# Patient Record
Sex: FEMALE | Race: OTHER | NOT HISPANIC OR LATINO | URBAN - METROPOLITAN AREA
[De-identification: names, ages, dates, MRNs, and addresses within clinical notes are randomized per-mention and may not be internally consistent; named-entity substitution may affect disease eponyms.]

---

## 2020-07-06 ENCOUNTER — INPATIENT (INPATIENT)
Facility: HOSPITAL | Age: 30
LOS: 2 days | Discharge: HOME | End: 2020-07-09
Attending: OBSTETRICS & GYNECOLOGY | Admitting: OBSTETRICS & GYNECOLOGY
Payer: COMMERCIAL

## 2020-07-06 VITALS
SYSTOLIC BLOOD PRESSURE: 109 MMHG | TEMPERATURE: 98 F | DIASTOLIC BLOOD PRESSURE: 67 MMHG | HEART RATE: 108 BPM | RESPIRATION RATE: 16 BRPM

## 2020-07-06 DIAGNOSIS — Z98.890 OTHER SPECIFIED POSTPROCEDURAL STATES: Chronic | ICD-10-CM

## 2020-07-06 DIAGNOSIS — O26.899 OTHER SPECIFIED PREGNANCY RELATED CONDITIONS, UNSPECIFIED TRIMESTER: ICD-10-CM

## 2020-07-06 DIAGNOSIS — Z3A.00 WEEKS OF GESTATION OF PREGNANCY NOT SPECIFIED: ICD-10-CM

## 2020-07-06 DIAGNOSIS — O42.92 FULL-TERM PREMATURE RUPTURE OF MEMBRANES, UNSPECIFIED AS TO LENGTH OF TIME BETWEEN RUPTURE AND ONSET OF LABOR: ICD-10-CM

## 2020-07-06 LAB
BASOPHILS # BLD AUTO: 0.03 K/UL — SIGNIFICANT CHANGE UP (ref 0–0.2)
BASOPHILS NFR BLD AUTO: 0.3 % — SIGNIFICANT CHANGE UP (ref 0–2)
BLD GP AB SCN SERPL QL: NEGATIVE — SIGNIFICANT CHANGE UP
EOSINOPHIL # BLD AUTO: 0.14 K/UL — SIGNIFICANT CHANGE UP (ref 0–0.5)
EOSINOPHIL NFR BLD AUTO: 1.4 % — SIGNIFICANT CHANGE UP (ref 0–6)
HCT VFR BLD CALC: 37.2 % — SIGNIFICANT CHANGE UP (ref 34.5–45)
HGB BLD-MCNC: 12.2 G/DL — SIGNIFICANT CHANGE UP (ref 11.5–15.5)
IMM GRANULOCYTES NFR BLD AUTO: 1 % — SIGNIFICANT CHANGE UP (ref 0–1.5)
LYMPHOCYTES # BLD AUTO: 1.94 K/UL — SIGNIFICANT CHANGE UP (ref 1–3.3)
LYMPHOCYTES # BLD AUTO: 18.7 % — SIGNIFICANT CHANGE UP (ref 13–44)
MCHC RBC-ENTMCNC: 26.9 PG — LOW (ref 27–34)
MCHC RBC-ENTMCNC: 32.8 % — SIGNIFICANT CHANGE UP (ref 32–36)
MCV RBC AUTO: 81.9 FL — SIGNIFICANT CHANGE UP (ref 80–100)
MONOCYTES # BLD AUTO: 0.66 K/UL — SIGNIFICANT CHANGE UP (ref 0–0.9)
MONOCYTES NFR BLD AUTO: 6.4 % — SIGNIFICANT CHANGE UP (ref 2–14)
NEUTROPHILS # BLD AUTO: 7.49 K/UL — HIGH (ref 1.8–7.4)
NEUTROPHILS NFR BLD AUTO: 72.2 % — SIGNIFICANT CHANGE UP (ref 43–77)
NRBC # FLD: 0 K/UL — SIGNIFICANT CHANGE UP (ref 0–0)
PLATELET # BLD AUTO: 226 K/UL — SIGNIFICANT CHANGE UP (ref 150–400)
PMV BLD: 9.5 FL — SIGNIFICANT CHANGE UP (ref 7–13)
RBC # BLD: 4.54 M/UL — SIGNIFICANT CHANGE UP (ref 3.8–5.2)
RBC # FLD: 16.3 % — HIGH (ref 10.3–14.5)
RH IG SCN BLD-IMP: POSITIVE — SIGNIFICANT CHANGE UP
RH IG SCN BLD-IMP: POSITIVE — SIGNIFICANT CHANGE UP
SARS-COV-2 RNA SPEC QL NAA+PROBE: SIGNIFICANT CHANGE UP
T PALLIDUM AB TITR SER: NEGATIVE — SIGNIFICANT CHANGE UP
WBC # BLD: 10.36 K/UL — SIGNIFICANT CHANGE UP (ref 3.8–10.5)
WBC # FLD AUTO: 10.36 K/UL — SIGNIFICANT CHANGE UP (ref 3.8–10.5)

## 2020-07-06 RX ORDER — OXYTOCIN 10 UNIT/ML
2 VIAL (ML) INJECTION
Qty: 30 | Refills: 0 | Status: DISCONTINUED | OUTPATIENT
Start: 2020-07-06 | End: 2020-07-07

## 2020-07-06 RX ORDER — OXYTOCIN 10 UNIT/ML
333.33 VIAL (ML) INJECTION
Qty: 20 | Refills: 0 | Status: DISCONTINUED | OUTPATIENT
Start: 2020-07-06 | End: 2020-07-07

## 2020-07-06 RX ORDER — BENZOYL PEROXIDE MICRONIZED 5.8 %
1 TOWELETTE (EA) TOPICAL
Qty: 0 | Refills: 0 | DISCHARGE

## 2020-07-06 RX ORDER — SODIUM CHLORIDE 9 MG/ML
1000 INJECTION, SOLUTION INTRAVENOUS
Refills: 0 | Status: DISCONTINUED | OUTPATIENT
Start: 2020-07-06 | End: 2020-07-07

## 2020-07-06 NOTE — OB PROVIDER TRIAGE NOTE - NSHPPHYSICALEXAM_GEN_ALL_CORE
T(C): 36.6 (07-06-20 @ 05:21), Max: 36.6 (07-06-20 @ 05:21)  HR: 99 (07-06-20 @ 05:51) (99 - 108)  BP: 109/67 (07-06-20 @ 05:35) (109/67 - 109/67)  RR: 16 (07-06-20 @ 05:21) (16 - 16)  SpO2: 96% (07-06-20 @ 05:51) (96% - 96%)    Heart: RRR  Lungs: CTA  Abdomen: Gravid, soft, NT    NST: Reactive with moderate variability, Category 1 tracing  [place]: Irregular contractions  VE: 1/40/-3, Gross ROM, clear fluid  TAS: Cephalic presentation

## 2020-07-06 NOTE — OB PROVIDER H&P - PROBLEM SELECTOR PLAN 1
-Admit to labor and delivery  -Cont EFM/[place]  -Admission labs: CBC, RPR, T&S  -IV hydration  -Clear liquid diet  -Cytotec for IOL

## 2020-07-06 NOTE — CHART NOTE - NSCHARTNOTEFT_GEN_A_CORE
NP note    Pt seen for placement of cervical balloon    T(C): 36.7 (2020 16:34), Max: 36.8 (2020 08:36)  T(F): 98.06 (2020 16:34), Max: 98.24 (2020 08:36)  HR: 100 (2020 14:34) (94 - 108)  BP: 107/56 (2020 14:34) (105/67 - 110/70)  RR: 16 (2020 14:32) (15 - 16)  SpO2: 98% (2020 06:01) (96% - 98%)  EFM Cat I  [place] q3-5min  /-3 unchanged    Cervical balloon placed without incident. Pt tolerated well. Instilled with 60ccs in uterine/vaginal balloons. Pt refused management at this time.     30 y/o  PROM IOL     -maintain cervical balloon  -continue PO cytotec  -Epidural PRN    hai, NP

## 2020-07-06 NOTE — CHART NOTE - NSCHARTNOTEFT_GEN_A_CORE
R4 OB Chart Note    Called by RN for CB out.  SVE 4/80/-2, bulging membranes.  FH bl 130, mod swathi, +accels, no decels.  [place] q2-3min.  Plan   - for pitocin if ctx space    d/w Dr. Racheal Godoy MD PGY4

## 2020-07-06 NOTE — OB PROVIDER H&P - NSHPPHYSICALEXAM_GEN_ALL_CORE
T(C): 36.6 (07-06-20 @ 05:21), Max: 36.6 (07-06-20 @ 05:21)  HR: 99 (07-06-20 @ 05:51) (99 - 108)  BP: 109/67 (07-06-20 @ 05:35) (109/67 - 109/67)  RR: 16 (07-06-20 @ 05:21) (16 - 16)  SpO2: 96% (07-06-20 @ 05:51) (96% - 96%)    Heart: RRR  Lungs: CTA  Abdomen: Gravid, soft, NT    NST: Reactive with moderate variability, Category 1 tracing  [place]: Irregular contractions  VE: 1/40/-3, Gross ROM, clear fluid  TAS: Cephalic presentation

## 2020-07-06 NOTE — CHART NOTE - NSCHARTNOTEFT_GEN_A_CORE
R4 OB Chart Note    Pt checked due to mild early decels on FHT and c/o rectal pressure.  SVE 5-6/80/-2.    TOCO q4-5min.      Plan   - will start pitocin per prior plan    KIMBERLY Godoy MD PGY4 R4 OB Chart Note    Pt checked due to mild early decels on FHT and c/o rectal pressure.  SVE 5-6/80/-2.    TOCO q4-5min.      Plan   - will start pitocin per prior plan    KIMBERLY Godoy MD PGY4      ADDENDUM:  pt continued to have early vs mild late decels.  IUPC placed for amnioinfusion per Dr. Springer.  KIMBERLY Godoy MD PGY4

## 2020-07-06 NOTE — OB PROVIDER TRIAGE NOTE - HISTORY OF PRESENT ILLNESS
29y white female  at 39w5d presents to triage c/o LOF since 3:30am today.  Reports +FM, no vaginal bleeding, no ROM or LOF. States feeling mild contractions  Prenatal care: Dr Vivar  GBS: Negative

## 2020-07-06 NOTE — OB PROVIDER TRIAGE NOTE - NSOBPROVIDERNOTE_OBGYN_ALL_OB_FT
A/P: 29y white female  at 39w5d with PROM  D/w Dr Leon  -Admit to labor and delivery  -Cont EFM/[place]  -Admission labs: CBC, RPR, T&S  -IV hydration  -Clear liquid diet  -Cytotec for IOL

## 2020-07-06 NOTE — OB PROVIDER H&P - ASSESSMENT
A/P: 29y white female  at 39w5d with PROM  D/w Dr Leon  -Admit to labor and delivery  -Cont EFM/[place]  -Admission labs: CBC, RPR, T&S  -IV hydration  -Clear liquid diet  -Cytotec for IOL

## 2020-07-06 NOTE — OB PROVIDER IHI INDUCTION/AUGMENTATION NOTE - NS_CHECKALL_OBGYN_ALL_OB
Order was written/H&P was completed/Induction / Augmentation was discussed/Contractions pattern was reviewed/FHR was reviewed

## 2020-07-07 DIAGNOSIS — O42.10 PREMATURE RUPTURE OF MEMBRANES, ONSET OF LABOR MORE THAN 24 HOURS FOLLOWING RUPTURE, UNSPECIFIED WEEKS OF GESTATION: ICD-10-CM

## 2020-07-07 PROCEDURE — 71045 X-RAY EXAM CHEST 1 VIEW: CPT | Mod: 26

## 2020-07-07 RX ORDER — ACETAMINOPHEN 500 MG
1000 TABLET ORAL ONCE
Refills: 0 | Status: COMPLETED | OUTPATIENT
Start: 2020-07-07 | End: 2020-07-07

## 2020-07-07 RX ORDER — CITRIC ACID/SODIUM CITRATE 300-500 MG
30 SOLUTION, ORAL ORAL ONCE
Refills: 0 | Status: DISCONTINUED | OUTPATIENT
Start: 2020-07-07 | End: 2020-07-07

## 2020-07-07 RX ORDER — FAMOTIDINE 10 MG/ML
20 INJECTION INTRAVENOUS ONCE
Refills: 0 | Status: COMPLETED | OUTPATIENT
Start: 2020-07-07 | End: 2020-07-07

## 2020-07-07 RX ORDER — OXYCODONE HYDROCHLORIDE 5 MG/1
5 TABLET ORAL
Refills: 0 | Status: DISCONTINUED | OUTPATIENT
Start: 2020-07-07 | End: 2020-07-09

## 2020-07-07 RX ORDER — NALOXONE HYDROCHLORIDE 4 MG/.1ML
0.1 SPRAY NASAL
Refills: 0 | Status: DISCONTINUED | OUTPATIENT
Start: 2020-07-07 | End: 2020-07-09

## 2020-07-07 RX ORDER — OXYTOCIN 10 UNIT/ML
333.33 VIAL (ML) INJECTION
Qty: 20 | Refills: 0 | Status: DISCONTINUED | OUTPATIENT
Start: 2020-07-07 | End: 2020-07-07

## 2020-07-07 RX ORDER — DIPHENOXYLATE HCL/ATROPINE 2.5-.025MG
2 TABLET ORAL ONCE
Refills: 0 | Status: DISCONTINUED | OUTPATIENT
Start: 2020-07-07 | End: 2020-07-07

## 2020-07-07 RX ORDER — SODIUM CHLORIDE 9 MG/ML
1000 INJECTION, SOLUTION INTRAVENOUS
Refills: 0 | Status: DISCONTINUED | OUTPATIENT
Start: 2020-07-07 | End: 2020-07-07

## 2020-07-07 RX ORDER — ERTAPENEM SODIUM 1 G/1
1000 INJECTION, POWDER, LYOPHILIZED, FOR SOLUTION INTRAMUSCULAR; INTRAVENOUS ONCE
Refills: 0 | Status: COMPLETED | OUTPATIENT
Start: 2020-07-07 | End: 2020-07-07

## 2020-07-07 RX ORDER — OXYCODONE HYDROCHLORIDE 5 MG/1
10 TABLET ORAL
Refills: 0 | Status: DISCONTINUED | OUTPATIENT
Start: 2020-07-07 | End: 2020-07-09

## 2020-07-07 RX ORDER — IBUPROFEN 200 MG
600 TABLET ORAL EVERY 6 HOURS
Refills: 0 | Status: COMPLETED | OUTPATIENT
Start: 2020-07-07 | End: 2021-06-05

## 2020-07-07 RX ORDER — LANOLIN
1 OINTMENT (GRAM) TOPICAL EVERY 6 HOURS
Refills: 0 | Status: DISCONTINUED | OUTPATIENT
Start: 2020-07-07 | End: 2020-07-09

## 2020-07-07 RX ORDER — HYDROMORPHONE HYDROCHLORIDE 2 MG/ML
1 INJECTION INTRAMUSCULAR; INTRAVENOUS; SUBCUTANEOUS
Refills: 0 | Status: DISCONTINUED | OUTPATIENT
Start: 2020-07-07 | End: 2020-07-07

## 2020-07-07 RX ORDER — ACETAMINOPHEN 500 MG
975 TABLET ORAL
Refills: 0 | Status: DISCONTINUED | OUTPATIENT
Start: 2020-07-07 | End: 2020-07-09

## 2020-07-07 RX ORDER — ONDANSETRON 8 MG/1
4 TABLET, FILM COATED ORAL ONCE
Refills: 0 | Status: COMPLETED | OUTPATIENT
Start: 2020-07-07 | End: 2020-07-07

## 2020-07-07 RX ORDER — DIPHENHYDRAMINE HCL 50 MG
25 CAPSULE ORAL EVERY 6 HOURS
Refills: 0 | Status: DISCONTINUED | OUTPATIENT
Start: 2020-07-07 | End: 2020-07-09

## 2020-07-07 RX ORDER — MORPHINE SULFATE 50 MG/1
3 CAPSULE, EXTENDED RELEASE ORAL ONCE
Refills: 0 | Status: DISCONTINUED | OUTPATIENT
Start: 2020-07-07 | End: 2020-07-07

## 2020-07-07 RX ORDER — HEPARIN SODIUM 5000 [USP'U]/ML
5000 INJECTION INTRAVENOUS; SUBCUTANEOUS EVERY 12 HOURS
Refills: 0 | Status: DISCONTINUED | OUTPATIENT
Start: 2020-07-07 | End: 2020-07-09

## 2020-07-07 RX ORDER — MAGNESIUM HYDROXIDE 400 MG/1
30 TABLET, CHEWABLE ORAL
Refills: 0 | Status: DISCONTINUED | OUTPATIENT
Start: 2020-07-07 | End: 2020-07-09

## 2020-07-07 RX ORDER — ONDANSETRON 8 MG/1
4 TABLET, FILM COATED ORAL EVERY 6 HOURS
Refills: 0 | Status: DISCONTINUED | OUTPATIENT
Start: 2020-07-07 | End: 2020-07-09

## 2020-07-07 RX ORDER — SODIUM CHLORIDE 9 MG/ML
1000 INJECTION INTRAMUSCULAR; INTRAVENOUS; SUBCUTANEOUS
Refills: 0 | Status: DISCONTINUED | OUTPATIENT
Start: 2020-07-07 | End: 2020-07-07

## 2020-07-07 RX ORDER — SIMETHICONE 80 MG/1
80 TABLET, CHEWABLE ORAL EVERY 4 HOURS
Refills: 0 | Status: DISCONTINUED | OUTPATIENT
Start: 2020-07-07 | End: 2020-07-09

## 2020-07-07 RX ORDER — TETANUS TOXOID, REDUCED DIPHTHERIA TOXOID AND ACELLULAR PERTUSSIS VACCINE, ADSORBED 5; 2.5; 8; 8; 2.5 [IU]/.5ML; [IU]/.5ML; UG/.5ML; UG/.5ML; UG/.5ML
0.5 SUSPENSION INTRAMUSCULAR ONCE
Refills: 0 | Status: DISCONTINUED | OUTPATIENT
Start: 2020-07-07 | End: 2020-07-09

## 2020-07-07 RX ORDER — OXYCODONE HYDROCHLORIDE 5 MG/1
5 TABLET ORAL ONCE
Refills: 0 | Status: DISCONTINUED | OUTPATIENT
Start: 2020-07-07 | End: 2020-07-09

## 2020-07-07 RX ORDER — HYDROMORPHONE HYDROCHLORIDE 2 MG/ML
0.5 INJECTION INTRAMUSCULAR; INTRAVENOUS; SUBCUTANEOUS
Refills: 0 | Status: DISCONTINUED | OUTPATIENT
Start: 2020-07-07 | End: 2020-07-07

## 2020-07-07 RX ORDER — FERROUS SULFATE 325(65) MG
325 TABLET ORAL DAILY
Refills: 0 | Status: DISCONTINUED | OUTPATIENT
Start: 2020-07-07 | End: 2020-07-09

## 2020-07-07 RX ORDER — KETOROLAC TROMETHAMINE 30 MG/ML
30 SYRINGE (ML) INJECTION EVERY 6 HOURS
Refills: 0 | Status: DISCONTINUED | OUTPATIENT
Start: 2020-07-07 | End: 2020-07-08

## 2020-07-07 RX ORDER — HYDROMORPHONE HYDROCHLORIDE 2 MG/ML
1 INJECTION INTRAMUSCULAR; INTRAVENOUS; SUBCUTANEOUS
Refills: 0 | Status: DISCONTINUED | OUTPATIENT
Start: 2020-07-07 | End: 2020-07-09

## 2020-07-07 RX ORDER — METOCLOPRAMIDE HCL 10 MG
10 TABLET ORAL ONCE
Refills: 0 | Status: COMPLETED | OUTPATIENT
Start: 2020-07-07 | End: 2020-07-07

## 2020-07-07 RX ADMIN — Medication 2 MILLIUNIT(S)/MIN: at 00:47

## 2020-07-07 RX ADMIN — HEPARIN SODIUM 5000 UNIT(S): 5000 INJECTION INTRAVENOUS; SUBCUTANEOUS at 22:08

## 2020-07-07 RX ADMIN — Medication 975 MILLIGRAM(S): at 22:10

## 2020-07-07 RX ADMIN — ERTAPENEM SODIUM 120 MILLIGRAM(S): 1 INJECTION, POWDER, LYOPHILIZED, FOR SOLUTION INTRAMUSCULAR; INTRAVENOUS at 15:46

## 2020-07-07 RX ADMIN — ONDANSETRON 4 MILLIGRAM(S): 8 TABLET, FILM COATED ORAL at 21:12

## 2020-07-07 RX ADMIN — Medication 10 MILLIGRAM(S): at 12:20

## 2020-07-07 RX ADMIN — FAMOTIDINE 20 MILLIGRAM(S): 10 INJECTION INTRAVENOUS at 12:20

## 2020-07-07 RX ADMIN — Medication 2 TABLET(S): at 15:00

## 2020-07-07 RX ADMIN — Medication 400 MILLIGRAM(S): at 16:12

## 2020-07-07 RX ADMIN — Medication 30 MILLIGRAM(S): at 17:30

## 2020-07-07 RX ADMIN — FAMOTIDINE 20 MILLIGRAM(S): 10 INJECTION INTRAVENOUS at 03:05

## 2020-07-07 RX ADMIN — ONDANSETRON 4 MILLIGRAM(S): 8 TABLET, FILM COATED ORAL at 07:32

## 2020-07-07 NOTE — PROVIDER CONTACT NOTE (OTHER) - ASSESSMENT
pt is tachycardic and has a fever of 38.0, pt uterus and fundus are firm and at umbilicus with moderate bleeding

## 2020-07-07 NOTE — CHART NOTE - NSCHARTNOTEFT_GEN_A_CORE
R3 Labor Note    Patient re-examined for cervical change    Vital Signs Last 24 Hrs  T(C): 36.4 (06 Jul 2020 22:00), Max: 36.8 (06 Jul 2020 08:36)  T(F): 97.52 (06 Jul 2020 22:00), Max: 98.24 (06 Jul 2020 08:36)  HR: 86 (07 Jul 2020 03:33) (82 - 123)  BP: 114/69 (07 Jul 2020 03:33) (86/49 - 117/71)  BP(mean): --  RR: 16 (06 Jul 2020 14:32) (15 - 16)  SpO2: 91% (07 Jul 2020 03:34) (89% - 100%)    FETAL HEART RATE: 130, mod swathi, +accels, no decels    [place]: 3-4ctx/10min not yet adequate with IUPC in place    CERVICAL EXAM: 6.5/80/-3    PAIN SCALE (0-10): 7/10    IMPRESSION: Labor in progress. Will call for topoff. Cat 1 tracing    D/w Dr. Racheal Billy PGY-3

## 2020-07-07 NOTE — PROVIDER CONTACT NOTE (OTHER) - ASSESSMENT
pt axox4. pt denies SOB. other vitals stable. pt desaturated to 89% room air. RN had patient sit up. Pt highest O2 saturation went to 93%. RN listened to patients lungs. pt lungs clear to bases.

## 2020-07-07 NOTE — CHART NOTE - NSCHARTNOTEFT_GEN_A_CORE
pt with ctx pain  ve 9/100/0, cervix edematous  fht 120s cat 1  toco ctx 2-3     pt with arrest of descent  recommend to pt and partner delivery by cs  pt consented to procedure  logistical huddle called- floor staff informed

## 2020-07-07 NOTE — DISCHARGE NOTE ANTEPARTUM - HOSPITAL COURSE
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 
prom  cytotec induction, pitocin induction

## 2020-07-07 NOTE — CHART NOTE - NSCHARTNOTEFT_GEN_A_CORE
OB attending labor Note:  Patient comfortable  VSS. Afebrile  ABd:gravid  pelvic:  8cm/90%/-1 station  exts:-CCE    pitocin at 14 mu/minute    contractions every 2-3 minutes  A: active labor  P: continue present management

## 2020-07-07 NOTE — CHART NOTE - NSCHARTNOTEFT_GEN_A_CORE
PGY1 Progress Note    Subjective  Called to pt bedside for re-evaluation. Pt reexamined. SVE as below. Pt is comfortable with epidural in place, not feeling any pressure.     Objective  T(C): 37.1 (07-07-20 @ 08:00), Max: 37.1 (07-07-20 @ 08:00)  HR: 70 (07-07-20 @ 09:49) (70 - 123)  BP: 101/64 (07-07-20 @ 09:47) (86/49 - 119/67)  RR: 16 (07-06-20 @ 14:32) (16 - 16)  SpO2: 100% (07-07-20 @ 09:54) (89% - 100%)    SVE: 9/100-cervix edematous/0  FHT: 120 baseline , mod variability, +accels, -decels  [place]: q2-3 min    Plan  Continue expectant management. Cervix is edematous on the right. Will try peanut ball.    Discussed with attending physician, Dr. Carolyn Steinberg MD  PGY-1

## 2020-07-07 NOTE — OB RN DELIVERY SUMMARY - NS_SEPSISRSKCALC_OBGYN_ALL_OB_FT
EOS calculated successfully. EOS Risk Factor: 0.5/1000 live births ([facility] national incidence); GA=39w6d; Temp=98.78; ROM=34.35; GBS='Negative'; Antibiotics='No antibiotics or any antibiotics < 2 hrs prior to birth'

## 2020-07-07 NOTE — DISCHARGE NOTE OB - CARE PROVIDER_API CALL
Meka Vivar  OBSTETRICS AND GYNECOLOGY  [address]  [place] [address]  Phone: [phone]  Fax: [phone]  Established Patient  Follow Up Time: 2 weeks

## 2020-07-07 NOTE — CHART NOTE - NSCHARTNOTEFT_GEN_A_CORE
Called to PACU for desaturation. O2 sats down to 89 with good waveform on the monitor. Not improved with deep breaths or repositioning. Patient was started on 2L of NC. Reports fatigue and lightheadedness, but denies SOB, CP, increased abdominal pain, or increased vaginal bleeding. Denies any history of respiratory problems or asthma. Denies history of VTE or PE, no family history of coagulopathies.     VS  T(C): 38 (07-07-20 @ 15:30)  HR: 84 (07-07-20 @ 17:00)  BP: 110/67 (07-07-20 @ 17:00)  RR: 18 (07-07-20 @ 17:00)  SpO2: 94% (07-07-20 @ 17:00)    PHYSICAL EXAM:      Constitutional: NAD  Respiratory: No increased WOB, speaking in complete sentences. Breath sounds clear bilaterally. No wheezing or crackles appreciated bilaterally.  Cardiovascular: RRR, no murmurs  Abdominal: Fundus firm, appropriate tenderness. No rebound, guarding, or rigidity  Extremities: No pedal edema    Assessment:  30 y/o F POD#0 from pLTCS with decreased O2 sats in PACU. Unable to maintain oxygenation with IS. Potentially due to fluid overload, less likely PE.     Plan:  - Portable CXR   - Supplemental oxygen to maintain sats above 95%  - Continue to closely monitor VSS

## 2020-07-07 NOTE — CHART NOTE - NSCHARTNOTEFT_GEN_A_CORE
pt feeling more pain with ctx  ve 9/100/0 + caput    fht 120s, mod variability, +accels , no decels  toco-ctx q 2-4       pt for epidural bolus  reviewed with pt and partner concern for protracted descent and change in exam  pt for repositioning and continued intrauterine resuscitation   for rpt exam in an hour

## 2020-07-07 NOTE — OB PROVIDER DELIVERY SUMMARY - NSPROVIDERDELIVERYNOTE_OBGYN_ALL_OB_FT
liveborn male apgar 78/9 9lb6oz from ROP  placenta delivered manually with 3vc  nl appearing uterus/tube/ovaries. filmy adhesion noted along left posterior uterine serosa  uterus noted to be atonic- bimanual massage, methergine and carbprost used to help improve tone     ebl 1000ml liveborn male apgar 78/9 9lb6oz from ROP  placenta delivered manually with 3vc  nl appearing uterus/tube/ovaries. filmy adhesion noted along left posterior uterine serosa  uterus noted to be atonic- bimanual massage, methergine and carbprost used to help improve tone     ebl 1000ml  qbl 339

## 2020-07-07 NOTE — DISCHARGE NOTE ANTEPARTUM - PATIENT PORTAL LINK FT
You can access the FollowMyHealth Patient Portal offered by [facility] by registering at the following website: [email or web address]. By joining [employer]’s FollowMyHealth portal, you will also be able to view your health information using other applications (apps) compatible with our system.

## 2020-07-07 NOTE — DISCHARGE NOTE OB - PATIENT PORTAL LINK FT
You can access the FollowMyHealth Patient Portal offered by [facility] by registering at the following website: [email or web address]. By joining [employer]’s FollowMyHealth portal, you will also be able to view your health information using other applications (apps) compatible with our system.

## 2020-07-07 NOTE — CHART NOTE - NSCHARTNOTEFT_GEN_A_CORE
pt feeling more ctx pain  ve 9/100/0  fht 130s mod variability, + accels, no decels  toco ctx q 2-3 min      cont current mgmt  anesthesia for epi bolus

## 2020-07-07 NOTE — CHART NOTE - NSCHARTNOTEFT_GEN_A_CORE
Pt seen and evaluated at bedside due to recurrent desaturation to high 80s, low 90s while on room air.  Patient placed on supplemental O2 via nasal cannula at 2L w/ modest improvement, however saturation repetitively in the low 90s.  Pt reports feeling well. Denies SOB.  Denies hx of RADHIKA.      Pt s/p CXR.  Verbal read from radiology no pulmonary edema.  Likely modest atelectasis.    Pulse ox replaced and moved to air w/ improvement to 100% on RA.    d/w Dr. Carolyn Hall PGY2

## 2020-07-07 NOTE — PROVIDER CONTACT NOTE (OTHER) - ACTION/TREATMENT ORDERED:
MD Hardin at bedside. assessing patient. MD Hardin at bedside. assessing patient. pt encouraged incentive spirometer use. pt on nasal cannula. MD ANDREA Mon to speak with team about plan of care.

## 2020-07-07 NOTE — DISCHARGE NOTE ANTEPARTUM - CARE PROVIDER_API CALL
Meka Vivar  OBSTETRICS AND GYNECOLOGY  [address]  [place] [address]  Phone: [phone]  Fax: [phone]  Established Patient  Follow Up Time: Routine

## 2020-07-07 NOTE — OB RN DELIVERY SUMMARY - NS_BREASTINHOURA_OBGYN_ALL_OB
per tablet Take 1 tablet by mouth every 4 hours as needed for Pain . Yes Historical Provider, MD   Ascorbic Acid (VITAMIN C) 1000 MG tablet Take 1,000 mg by mouth daily   Yes Historical Provider, MD   clopidogrel (PLAVIX) 75 MG tablet Take 1 tablet by mouth daily 12/18/17  Yes Apoorva Upton MD   ALPRAZolam (XANAX PO) Take 0.5 tablets by mouth 2 times daily as needed    Yes Historical Provider, MD   atorvastatin (LIPITOR) 20 MG tablet Take 20 mg by mouth daily   Yes Historical Provider, MD   docusate (COLACE, DULCOLAX) 100 MG CAPS Take 100 mg by mouth 2 times daily 11/21/16  Yes Kimberly Easton MD   albuterol (PROVENTIL HFA;VENTOLIN HFA) 108 (90 BASE) MCG/ACT inhaler Inhale 2 puffs into the lungs every 6 hours as needed for Wheezing. Yes Historical Provider, MD   fentaNYL (DURAGESIC) 50 MCG/HR Place 1 patch onto the skin every other day  . Yes Historical Provider, MD   aspirin 81 MG chewable tablet Take 1 tablet by mouth daily. 3/11/14  Yes Jakub Hoskins MD       REVIEW OF SYSTEMS    (2-9 systems for level 4, 10 or more for level 5)      Review of Systems   Constitutional: Negative for chills and fever. HENT: Negative for congestion and rhinorrhea. Eyes: Negative for photophobia and visual disturbance. Respiratory: Negative for cough and shortness of breath. Cardiovascular: Positive for chest pain. Negative for palpitations and leg swelling. Gastrointestinal: Negative for abdominal pain, nausea and vomiting. Genitourinary: Negative for decreased urine volume and urgency. Musculoskeletal: Negative for back pain and gait problem. Skin: Negative for rash. Neurological: Negative for light-headedness and headaches.        PHYSICAL EXAM   (up to 7 for level 4, 8 or more for level 5)      INITIAL VITALS:   /62   Pulse 52   Temp 98.9 °F (37.2 °C) (Oral)   Resp 18   Wt 149 lb (67.6 kg)   SpO2 95%   BMI 24.79 kg/m²     Physical Exam   Constitutional: He is oriented to person, place,
Offered, feeding was successful

## 2020-07-07 NOTE — PROVIDER CONTACT NOTE (OTHER) - RECOMMENDATIONS
RN placed pt on 2L on nasal cannula. RN to notify OB team. MD BRIELLE Deleon made aware.
pt should receive antibiotics and draw CBC

## 2020-07-07 NOTE — DISCHARGE NOTE OB - MEDICATION SUMMARY - MEDICATIONS TO TAKE
I will START or STAY ON the medications listed below when I get home from the hospital:    PNV Prenatal oral tablet  -- 1 tab(s) by mouth once a day  -- Indication: For supplement    Iron 100 Plus oral tablet  -- 1 tab(s) by mouth once a day  -- Indication: For supplement I will START or STAY ON the medications listed below when I get home from the hospital:    acetaminophen 325 mg oral tablet  -- 3 tab(s) by mouth   -- Indication: For As needed for pain    ibuprofen 600 mg oral tablet  -- 1 tab(s) by mouth every 6 hours  -- Indication: For As needed for pain     PNV Prenatal oral tablet  -- 1 tab(s) by mouth once a day  -- Indication: For supplement    Iron 100 Plus oral tablet  -- 1 tab(s) by mouth once a day  -- Indication: For supplement

## 2020-07-07 NOTE — DISCHARGE NOTE OB - CARE PLAN
Goal:	delivery  Assessment and plan of treatment:	f/u 2 wks with Dr Vivar [place] Discharge DX:	 delivery delivered  Goal:	delivery  Assessment and plan of treatment:	f/u 2 wks with Dr Vivar

## 2020-07-07 NOTE — PROVIDER CONTACT NOTE (OTHER) - BACKGROUND
pt s/p primary c/s for arrest of descent. pt developed postpartum fever of 38.0C.
pt delivered a liveborn male  @ 13:51 on 20,  ml, 2L in the OR 60ml of urine out; pt received a 1L bolus of LR post

## 2020-07-07 NOTE — OB NEONATOLOGY/PEDIATRICIAN DELIVERY SUMMARY - NSPEDSNEONOTESA_OBGYN_ALL_OB_FT
Baby boy born at 39.6 wks via CS to a 30 y/o  A+ blood type mother. Maternal history of kidney stones, on PNV, iron. PNL nr/Rubella pending/-, GBS - on . SROM at 3Am  with clear fluids. Baby emerged vigorous, not crying until suctioned, was w/d/s/s with APGARS of 8/9. Mom would like to breast feed, requests Hep B and consents. EOS 0.31

## 2020-07-07 NOTE — OB RN DELIVERY SUMMARY - NS_PLACENTDISPOA_OBGYN_ALL_OB
Addended by: ANGI BRAGA on: 4/23/2019 11:49 AM     Modules accepted: Orders    
Discarded in the usual manner

## 2020-07-08 LAB
BASOPHILS # BLD AUTO: 0.05 K/UL — SIGNIFICANT CHANGE UP (ref 0–0.2)
BASOPHILS NFR BLD AUTO: 0.2 % — SIGNIFICANT CHANGE UP (ref 0–2)
EOSINOPHIL # BLD AUTO: 0.01 K/UL — SIGNIFICANT CHANGE UP (ref 0–0.5)
EOSINOPHIL NFR BLD AUTO: 0 % — SIGNIFICANT CHANGE UP (ref 0–6)
HCT VFR BLD CALC: 29 % — LOW (ref 34.5–45)
HGB BLD-MCNC: 9.5 G/DL — LOW (ref 11.5–15.5)
IMM GRANULOCYTES NFR BLD AUTO: 1.1 % — SIGNIFICANT CHANGE UP (ref 0–1.5)
LYMPHOCYTES # BLD AUTO: 1.54 K/UL — SIGNIFICANT CHANGE UP (ref 1–3.3)
LYMPHOCYTES # BLD AUTO: 7.4 % — LOW (ref 13–44)
MCHC RBC-ENTMCNC: 27.5 PG — SIGNIFICANT CHANGE UP (ref 27–34)
MCHC RBC-ENTMCNC: 32.8 % — SIGNIFICANT CHANGE UP (ref 32–36)
MCV RBC AUTO: 84.1 FL — SIGNIFICANT CHANGE UP (ref 80–100)
MONOCYTES # BLD AUTO: 1.19 K/UL — HIGH (ref 0–0.9)
MONOCYTES NFR BLD AUTO: 5.7 % — SIGNIFICANT CHANGE UP (ref 2–14)
NEUTROPHILS # BLD AUTO: 17.72 K/UL — HIGH (ref 1.8–7.4)
NEUTROPHILS NFR BLD AUTO: 85.6 % — HIGH (ref 43–77)
NRBC # FLD: 0 K/UL — SIGNIFICANT CHANGE UP (ref 0–0)
PLATELET # BLD AUTO: 190 K/UL — SIGNIFICANT CHANGE UP (ref 150–400)
PMV BLD: 9.8 FL — SIGNIFICANT CHANGE UP (ref 7–13)
RBC # BLD: 3.45 M/UL — LOW (ref 3.8–5.2)
RBC # FLD: 16.2 % — HIGH (ref 10.3–14.5)
WBC # BLD: 20.74 K/UL — HIGH (ref 3.8–10.5)
WBC # FLD AUTO: 20.74 K/UL — HIGH (ref 3.8–10.5)

## 2020-07-08 RX ORDER — ACETAMINOPHEN 500 MG
3 TABLET ORAL
Qty: 0 | Refills: 0 | DISCHARGE
Start: 2020-07-08

## 2020-07-08 RX ORDER — IBUPROFEN 200 MG
1 TABLET ORAL
Qty: 0 | Refills: 0 | DISCHARGE
Start: 2020-07-08

## 2020-07-08 RX ORDER — IBUPROFEN 200 MG
600 TABLET ORAL EVERY 6 HOURS
Refills: 0 | Status: DISCONTINUED | OUTPATIENT
Start: 2020-07-08 | End: 2020-07-09

## 2020-07-08 RX ADMIN — Medication 600 MILLIGRAM(S): at 18:27

## 2020-07-08 RX ADMIN — Medication 600 MILLIGRAM(S): at 12:35

## 2020-07-08 RX ADMIN — Medication 600 MILLIGRAM(S): at 23:27

## 2020-07-08 NOTE — PROGRESS NOTE ADULT - PROBLEM SELECTOR PLAN 1
- Continue with po analgesia  - Increase ambulation  - Continue regular diet  - Incision dressing removed    Sary Steinberg  PGY-1

## 2020-07-08 NOTE — PROGRESS NOTE ADULT - SUBJECTIVE AND OBJECTIVE BOX
Patient seen and examined at bedside. No acute complaints, pain well controlled. Patient is ambulating and tolerating regular diet. Has not yet passed flatus. Voiding spontaneously.     Vital Signs Last 24 Hours  T(C): 36.6 (07-08-20 @ 05:51), Max: 38 (07-07-20 @ 15:30)  HR: 99 (07-08-20 @ 05:51) (68 - 128)  BP: 94/56 (07-08-20 @ 05:51) (77/57 - 118/52)  RR: 18 (07-08-20 @ 05:51) (15 - 28)  SpO2: 98% (07-08-20 @ 05:51) (87% - 100%)    I&O's Summary    07 Jul 2020 07:01  -  08 Jul 2020 07:00  --------------------------------------------------------  IN: 3500 mL / OUT: 3224 mL / NET: 276 mL        Physical Exam:  General: NAD  Abdomen: Soft, non-tender, non-distended, fundus firm  Incision: Pfannenstiel incision CDI, subcuticular suture closure  Pelvic: Lochia wnl    Labs:    Blood Type: A Positive  Antibody Screen: --  RPR: Negative               9.5    20.74 )-----------( 190      ( 07-08 @ 05:30 )             29.0                12.2   10.36 )-----------( 226      ( 07-06 @ 06:20 )             37.2         MEDICATIONS  (STANDING):  acetaminophen   Tablet .. 975 milliGRAM(s) Oral <User Schedule>  diphtheria/tetanus/pertussis (acellular) Vaccine (ADAcel) 0.5 milliLiter(s) IntraMuscular once  ferrous    sulfate 325 milliGRAM(s) Oral daily  heparin   Injectable 5000 Unit(s) SubCutaneous every 12 hours  ibuprofen  Tablet. 600 milliGRAM(s) Oral every 6 hours  ketorolac   Injectable 30 milliGRAM(s) IV Push every 6 hours  prenatal multivitamin 1 Tablet(s) Oral daily    MEDICATIONS  (PRN):  diphenhydrAMINE 25 milliGRAM(s) Oral every 6 hours PRN Itching  HYDROmorphone  Injectable 1 milliGRAM(s) IV Push every 3 hours PRN Severe Pain (7 - 10)  lanolin Ointment 1 Application(s) Topical every 6 hours PRN Sore Nipples  magnesium hydroxide Suspension 30 milliLiter(s) Oral two times a day PRN Constipation  naloxone Injectable 0.1 milliGRAM(s) IV Push every 3 minutes PRN For ANY of the following changes in patient status:  A. RR LESS THAN 10 breaths per minute, B. Oxygen saturation LESS THAN 90%, C. Sedation score of 6  ondansetron Injectable 4 milliGRAM(s) IV Push every 6 hours PRN Nausea  oxyCODONE    IR 5 milliGRAM(s) Oral every 3 hours PRN Mild Pain (1 - 3)  oxyCODONE    IR 10 milliGRAM(s) Oral every 3 hours PRN Moderate Pain (4 - 6)  oxyCODONE    IR 5 milliGRAM(s) Oral every 3 hours PRN Moderate to Severe Pain (4-10)  oxyCODONE    IR 5 milliGRAM(s) Oral once PRN Moderate to Severe Pain (4-10)  simethicone 80 milliGRAM(s) Chew every 4 hours PRN Gas

## 2020-07-08 NOTE — LACTATION INITIAL EVALUATION - LACTATION INTERVENTIONS
Infant less than 24 hours old. S/P transfer from NICU for maternal temp. Infant was given formula in NICU, resistant to latch. some formula placed on small nipple shield that was given to pt. with instructions in use and cleaning.  Pt. exhibited understanding. Infant took some small sucks. Given hand pump to help josue nipple, prior to trying to get the baby on the breast. Mom educated that babies less than 24 hours of age will be sleepy. Will continue to follow. RN made aware of plan and to assist with further feedings as needed. Instructed to request assistance prn.

## 2020-07-08 NOTE — PROGRESS NOTE ADULT - ATTENDING COMMENTS
Patient seen and agree with above.  + Flatus + void.  Pain controlled with NSAIDS  VSS  Temp 100.4 @ 3:30 pm 7/7    Abd soft NT/ND incision c/d/i  Ext - c/c +trace edema    IMP  PPD#1  post CS failure to dilate  afebrile   PLAN  continue post op care  increase ambulation  discharge 7/9  NESTOR Vivar

## 2020-07-09 VITALS
DIASTOLIC BLOOD PRESSURE: 62 MMHG | SYSTOLIC BLOOD PRESSURE: 103 MMHG | RESPIRATION RATE: 18 BRPM | HEART RATE: 89 BPM | OXYGEN SATURATION: 100 % | TEMPERATURE: 98 F

## 2020-07-09 LAB
HCT VFR BLD CALC: 28 % — LOW (ref 34.5–45)
HGB BLD-MCNC: 9 G/DL — LOW (ref 11.5–15.5)
MCHC RBC-ENTMCNC: 27.2 PG — SIGNIFICANT CHANGE UP (ref 27–34)
MCHC RBC-ENTMCNC: 32.1 % — SIGNIFICANT CHANGE UP (ref 32–36)
MCV RBC AUTO: 84.6 FL — SIGNIFICANT CHANGE UP (ref 80–100)
NRBC # FLD: 0 K/UL — SIGNIFICANT CHANGE UP (ref 0–0)
PLATELET # BLD AUTO: 201 K/UL — SIGNIFICANT CHANGE UP (ref 150–400)
PMV BLD: 9.9 FL — SIGNIFICANT CHANGE UP (ref 7–13)
RBC # BLD: 3.31 M/UL — LOW (ref 3.8–5.2)
RBC # FLD: 16.2 % — HIGH (ref 10.3–14.5)
WBC # BLD: 15.88 K/UL — HIGH (ref 3.8–10.5)
WBC # FLD AUTO: 15.88 K/UL — HIGH (ref 3.8–10.5)

## 2020-07-09 RX ADMIN — Medication 600 MILLIGRAM(S): at 05:28

## 2020-07-09 RX ADMIN — Medication 600 MILLIGRAM(S): at 10:28

## 2020-07-09 NOTE — PROGRESS NOTE ADULT - SUBJECTIVE AND OBJECTIVE BOX
Patient assessed at 0757.  Subjective  Pain: Patient denies any pain at the time of assessment. Pain being managed well by pain management protocol.  Complaints: None. Patient denies any headache, blur vision, dizziness and/or weakness/fatigue. Patient requesting to be discharge to home today.   Infant feeding: was breastfeeding now formula feeding. May start to pump and feed or may not breastfeed.   Feeding related issues and/or concerns: patient reports issues latching infant due to slightly flat nipple and was seen by lactation        Vitals  T(C): 36.8 (20 @ 05:44), Max: 36.9 (20 @ 22:28)  HR: 89 (20 @ 05:44) (76 - 101)  BP: 103/62 (20 @ 05:44) (95/50 - 129/81)  RR: 18 (20 @ 05:44) (17 - 18)  SpO2: 100% (20 @ 05:44) (97% - 100%)  Wt(kg): --               LABS:               9.0    15.88 )-----------( 201      ( 2020 06:00 )             28.0                9.5  20.74 )-----------( 190      ( 2020 05:30 )              29    Blood Type: A Positive    RPR: Negative    COVID-19 negative          MEDICATIONS  (STANDING):  acetaminophen   Tablet .. 975 milliGRAM(s) Oral <User Schedule>  diphtheria/tetanus/pertussis (acellular) Vaccine (ADAcel) 0.5 milliLiter(s) IntraMuscular once  ferrous    sulfate 325 milliGRAM(s) Oral daily  heparin   Injectable 5000 Unit(s) SubCutaneous every 12 hours  ibuprofen  Tablet. 600 milliGRAM(s) Oral every 6 hours  prenatal multivitamin 1 Tablet(s) Oral daily    MEDICATIONS  (PRN):  diphenhydrAMINE 25 milliGRAM(s) Oral every 6 hours PRN Itching  lanolin Ointment 1 Application(s) Topical every 6 hours PRN Sore Nipples  magnesium hydroxide Suspension 30 milliLiter(s) Oral two times a day PRN Constipation  oxyCODONE    IR 5 milliGRAM(s) Oral every 3 hours PRN Moderate to Severe Pain (4-10)  oxyCODONE    IR 5 milliGRAM(s) Oral once PRN Moderate to Severe Pain (4-10)  simethicone 80 milliGRAM(s) Chew every 4 hours PRN Gas          28y/o     Day#2    primary post-operative  section delivery for  arrest of dilatation, arrest of descent                                        Condition: Stable  Past Medical/Surgical/OB/GYN History significant for: right ovarian cystectomy (), anemia, renal stone at 15y/o  Current Issues: elevated WBC 20.74 repeat this am 15.88 afebrile.   Alert and orientedx3. Out of bed ambulating. Positive flatus. Negative bowel movement, denies urge and/or nausea/vomiting. Voiding.  Tolerating a regular diet.  Lung sounds clear bilaterally.  Breasts: slightly boyle, nontender  Nipples: intact, slightly flat  Abdomen: Soft, nondistended and nontender. Bowel sounds present. Fundus firm  Abdominal incision: Clean, dry and intact with steri strips.   Vaginal: Lochia light rubra  Extremities: Moderate edema noted bilaterally and equal to lower extremities, nontender with no erythremic areas noted. Positive pedal pulses. No palpable veins noted  Other relevant physical exam findings: none          Plan  Continue routine post-operative and postpartum care  Increase ambulation, analgesia PRN and pain medication protocol of standing ibuprofen and acetaminophen and oxycodone prn  Encouraged to wear abdominal binder for support and to use incentive spirometer  Discussed breast/nipple/engorgement care, breastfeeding, breast pumping and care of breast if patient decides not to breastfeed.   Discharge to home today. Discussed discharge planning.

## 2021-09-30 NOTE — OB NEONATOLOGY/PEDIATRICIAN DELIVERY SUMMARY - NS_MECONIUTRACHA_OBGYN_ALL_OB
Problem: Pain  Goal: #Acceptable pain level achieved/maintained at rest using NRS/Faces  Description: This goal is used for patients who can self-report.  Acceptable means the level is at or below the identified comfort/function goal.  Outcome: Outcome Met, Continue evaluating goal progress toward completion  Goal: # Acceptable pain level achieved/maintained at rest using NRS/Faces without oversedation (opioid naive or PCA/Epidural infusion)  Description: This goal is used if Opioid-naïve or on PCA/Epidural Infusion.  Outcome: Outcome Met, Continue evaluating goal progress toward completion  Goal: # Acceptable pain level achieved/maintained with activity using NRS/Faces  Description: This goal is used for patients who can self-report and are not achieving acceptable pain control during activity.  Outcome: Outcome Met, Continue evaluating goal progress toward completion  Goal: Acceptable pain/comfort level is achieved/maintained at rest (based on Pain Behaviors Scale)  Description: This goal is used for patients who are not able to self-report pain and are assessed for pain using the Pain Behaviors Scale  Outcome: Outcome Met, Continue evaluating goal progress toward completion  Goal: Acceptable pain/comfort level is achieved/maintained at rest based on PAINAID scale (Dementia)  Description: This goal is used for patients who are not able to self-report pain, have dementia, and assessed using the PAINAD scale.  Outcome: Outcome Met, Continue evaluating goal progress toward completion  Goal: Acceptable pain/comfort level is achieved/maintained at rest (based on pediatric behavior tool: NIPS, NPASS, or FLACC)  Description: This goal is used for pediatric patients who are not able to self report pain.  Outcome: Outcome Met, Continue evaluating goal progress toward completion  Goal: # Verbalizes understanding of pain management  Description: Documented in Patient Education Activity  Outcome: Outcome Met, Continue  evaluating goal progress toward completion  Goal: Verbalizes understanding and effective use of Patient Controlled Analgesia (PCA)  Description: Documented in Patient Education Activity  This goal is used for patients with PCA  Outcome: Outcome Met, Continue evaluating goal progress toward completion  Goal: Maximum comfort achieved/maintained at end of life (Hospice)  Outcome: Outcome Met, Continue evaluating goal progress toward completion      None

## 2023-01-10 NOTE — OB PROVIDER H&P - NSHPREVIEWOFSYSTEMS_GEN_ALL_CORE
REVIEW OF SYSTEMS:  CONSTITUTIONAL: No weakness, fevers or chills  EYES/ENT: No visual changes;  No vertigo or throat pain   NECK: No pain or stiffness  RESPIRATORY: No cough, wheezing, hemoptysis; No shortness of breath  CARDIOVASCULAR: No chest pain or palpitations  GASTROINTESTINAL: No abdominal or epigastric pain. No nausea, vomiting, or hematemesis; No diarrhea or constipation. No melena or hematochezia.  GENITOURINARY: No dysuria, frequency or hematuria  NEUROLOGICAL: No numbness or weakness  SKIN: No itching, burning, rashes, or lesions   All other review of systems is negative unless indicated above Qbrexza Pregnancy And Lactation Text: There is no available data on Qbrexza use in pregnant women.  There is no available data on Qbrexza use in lactation.

## 2023-11-22 NOTE — OB RN PATIENT PROFILE - PSH
Medication: candesartan 8 mG  Last office visit date: 1/4/23  Medication Refill Protocol Failed.  Medication Refill Protocol Failed. Courtesy Refill provided after ARB Angiotension Receptor Blocker - Angiotension II Receptor Antagonist Refill Protocol - 12 Month Protocol Failed 11/22/2023 04:05 AM per protocol guidelines. Writer confirmed, courtesy refill was not a duplicate.      Last OV Plan of care: scheduled 11/28/23  Last Refill:  11/16/22  Number of Refills Sent:  0  Quantity of Medication Given:  90 day supply     Controlled Substance: No        Date of any Lab Results pertaining to medication: None        H/O ovarian cystectomy  Right side in 2012

## 2024-04-25 NOTE — OB RN PATIENT PROFILE - INSTRUCTED TO PATIENT: IF THE INFANT IS NOT PINK DURING SKIN TO SKIN, THE PARENTS IS TO SEEK ASSISTANCE IMMEDIATELY.
I reviewed the progress note and agree with the resident’s findings and plans as written. Case discussed with resident.    Elieser Portillo, PharmD       Statement Selected

## 2024-05-31 NOTE — LACTATION INITIAL EVALUATION - LIVING CHILDREN, OB PROFILE
N/A Patient is under age 18 and does not have a history of high risk behavior or is not high risk for Hep C
0